# Patient Record
(demographics unavailable — no encounter records)

---

## 2025-01-09 NOTE — HISTORY OF PRESENT ILLNESS
[de-identified] : 1/9/25: 54 yr old female complaining of bilateral knee pain (L>R). This is a chronic issue without inciting injury. This is her first evaluation. She has used ice and Tylenol to treat it. Denies locking, buckling, prior injury. 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 PMH: Hypothyroid. HLD.

## 2025-01-09 NOTE — ASSESSMENT
[FreeTextEntry1] : 01/09/2025: Bilateral knee x-rays, 4 views, reveal no sig arthritic changes, no fx Underlying pathology reviewed and treatment options discussed. Obtain MRI LT knee R/O meniscus tear Start PT and HEP to improve mechanics and reduce pain. Activity modification as tolerated. Questions addressed. Follow up after MRI.

## 2025-01-09 NOTE — PHYSICAL EXAM
[Bilateral] : knee bilaterally [NL (0)] : extension 0 degrees [5___] : hamstring 5[unfilled]/5 [] : no ecchymosis [FreeTextEntry8] : IT band tenderness [TWNoteComboBox7] : flexion 130 degrees

## 2025-01-23 NOTE — DATA REVIEWED
[MRI] : MRI [Left] : left [Knee] : knee [Report was reviewed and noted in the chart] : The report was reviewed and noted in the chart [I independently reviewed and interpreted images and report] : I independently reviewed and interpreted images and report [FreeTextEntry1] : MRI of LT knee [facility] 01/14/2025:  IMPRESSION: 1. Medial and lateral meniscal degeneration without tear. 2. NO acute fracture, ligament tear, or loose body. 3. Extensor mechanism tendinitis and mild soft tissue swelling ventral to the patella and anterior tibial tubercle with slight patellofemoral effusion and synovitis. 4. Patient motion degrades image quality on multiple imaging sequences.

## 2025-01-23 NOTE — ASSESSMENT
[FreeTextEntry1] : 01/09/2025: Bilateral knee x-rays, 4 views, reveal no sig arthritic changes, no fx Underlying pathology reviewed and treatment options discussed. Obtain MRI LT knee R/O meniscus tear Start PT and HEP to improve mechanics and reduce pain. Activity modification as tolerated. Questions addressed. Follow up after MRI.  01/23/2025: Reviewed MRI results of Left knee. Underlying pathology reviewed and treatment options discussed. Activity modification as tolerated. Questions addressed. Follow up after her MRI of lumbar spine to discuss PT.  The documentation recorded by the scribe accurately reflects the service I personally performed and the decisions made by me. I, Porsche Johnson, attest that this documentation has been prepared under the direction and in the presence of Provider Surinder Arce MD.   The patient was seen by Surinder Arce MD.

## 2025-01-23 NOTE — HISTORY OF PRESENT ILLNESS
[de-identified] : 01/23/2025: Patients here to review MRI results of Left knee. Patient notes no changes. Patient hasn't started PT because she's waiting for her MRI of back to be done.   1/9/25: 54 yr old female complaining of bilateral knee pain (L>R). This is a chronic issue without inciting injury. This is her first evaluation. She has used ice and Tylenol to treat it. Denies locking, buckling, prior injury. 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 PMH: Hypothyroid. HLD.

## 2025-01-28 NOTE — IMAGING
[No bony abnormalities] : No bony abnormalities [No spinal deformity, fracture, lytic lesion, or marked single level collapse] : No spinal deformity, fracture, lytic lesion, or marked single level collapse [No instability seen on flexion/extension] : No instability seen on flexion/extension [AP] : anteroposterior [There are no fractures, subluxations or dislocations. No significant abnormalities are seen] : There are no fractures, subluxations or dislocations. No significant abnormalities are seen [de-identified] : L Spine Inspection: No defects or deformities Palpation: Spasms in b/l lumbar paraspinal musculature ROM: diminished in all planes d/t stiffness.  Strength: 5/5 b/l hip flexors, knee extensors, ankle dorsiflexors, EHL, ankle plantarflexors Neuro: Sensation LT I Negative b/l SLR Toe and heal walk intact Gait non antalgic

## 2025-01-28 NOTE — ASSESSMENT
[FreeTextEntry1] : 55 y/o F with chronic lower back pain into b/l LE x 1 year. XR: no fx, instability, collapses or deformities. Exam consistent with spasm, no weakness or red flags. Tried OTC meds with minimal relief.   - Recommend a course of PT. Rx for NSAID, muscle relaxer and pain medication sent to pharmacy. Recommend a full course of PT and patient discussed that if this is helpful but symptoms not completely resolved she can call to have new rx for a second round of PT filled. If after first round of PT no improvement, can call and will get MRI study to evaluate further; patient reassured good chance that symptoms improve with PT/meds.  Progress Note entered by Clemente Montes PA-C working as a scribe for Dr. Mayorga. Patient seen by Clemente Montes PA-C under the supervision of Dr. Mayorga.

## 2025-01-28 NOTE — HISTORY OF PRESENT ILLNESS
[Lower back] : lower back [de-identified] :  01/28/2025 54 year F presenting for evaluation of lower back pain that started about 1 year ago. No injury. Pain localized across lower back, radiates down b/l LE anterior thigh. Pain occasionally wakes her up at night. Denies N/T or weakness. No b/b dysfunction. Patient attributes pain to her occupation ([employer]). Taking OTC meds (Tylenol & Ibuprofen) with minimal relief. Denies prior injections or surgery.

## 2025-03-06 NOTE — HISTORY OF PRESENT ILLNESS
[de-identified] : 54 year old. cyst posterior scalp. recurrent.  [FreeTextEntry1] : recurrent cyst

## 2025-03-06 NOTE — PROCEDURE
[___ cm] : [unfilled] cm [___ ml of 1% lidocaine w/ 1:100,000 epinephrine] : [unfilled] ml of 1% lidocaine with 1:100,000 epinephrine [Pressure] : pressure [4-0] : 4-0 Chromic [____ cm] : [unfilled] cm [____ days] : [unfilled] days [FreeTextEntry1] : Oneal Jarvis [FreeTextEntry7] : posterior scalp [TWNoteComboBox1] : Epidermal Inclusion Cyst [TWNoteComboBox4] : Epidermal Inclusion Cyst [TWNoteComboBox3] : Subcutaneous fat [TWNoteComboBox5] : Subcutaneous fat